# Patient Record
Sex: FEMALE | Race: OTHER | Employment: UNEMPLOYED | URBAN - METROPOLITAN AREA
[De-identification: names, ages, dates, MRNs, and addresses within clinical notes are randomized per-mention and may not be internally consistent; named-entity substitution may affect disease eponyms.]

---

## 2017-06-05 ENCOUNTER — OFFICE VISIT (OUTPATIENT)
Dept: OBGYN CLINIC | Facility: CLINIC | Age: 27
End: 2017-06-05

## 2017-06-05 VITALS
SYSTOLIC BLOOD PRESSURE: 115 MMHG | DIASTOLIC BLOOD PRESSURE: 71 MMHG | HEART RATE: 75 BPM | BODY MASS INDEX: 26.42 KG/M2 | HEIGHT: 63.5 IN | WEIGHT: 151 LBS

## 2017-06-05 DIAGNOSIS — N92.6 MISSED MENSES: Primary | ICD-10-CM

## 2017-06-05 PROCEDURE — 81025 URINE PREGNANCY TEST: CPT | Performed by: ADVANCED PRACTICE MIDWIFE

## 2017-06-05 PROCEDURE — 99202 OFFICE O/P NEW SF 15 MIN: CPT | Performed by: ADVANCED PRACTICE MIDWIFE

## 2017-06-05 RX ORDER — CHOLECALCIFEROL (VITAMIN D3) 25 MCG
1 TABLET,CHEWABLE ORAL DAILY
COMMUNITY

## 2017-06-06 PROBLEM — O09.892 SHORT INTERVAL BETWEEN PREGNANCIES AFFECTING PREGNANCY IN SECOND TRIMESTER, ANTEPARTUM: Status: ACTIVE | Noted: 2017-06-06

## 2017-06-06 NOTE — PROGRESS NOTES
Dennis Finley is a 28year old , current EGA of 10w5d presents for amenorrhea. Reports LMP as 3/23/17 with regular 30 day cycles. Regular cycles since 3 months PP. This is a planned pregnancy and patient is excited.   HAS AN 6MONTH OLD, is contin

## 2017-06-14 ENCOUNTER — NURSE ONLY (OUTPATIENT)
Dept: OBGYN CLINIC | Facility: CLINIC | Age: 27
End: 2017-06-14

## 2017-06-14 VITALS — HEIGHT: 63 IN | WEIGHT: 151.63 LBS | BODY MASS INDEX: 26.87 KG/M2

## 2017-06-14 DIAGNOSIS — Z34.81 ENCOUNTER FOR SUPERVISION OF OTHER NORMAL PREGNANCY IN FIRST TRIMESTER: Primary | ICD-10-CM

## 2017-06-14 DIAGNOSIS — Z83.3 FAMILY HISTORY OF DIABETES IN PREGNANCY: ICD-10-CM

## 2017-06-14 PROBLEM — O09.899 SHORT INTERVAL BETWEEN PREGNANCIES AFFECTING PREGNANCY, ANTEPARTUM: Status: ACTIVE | Noted: 2017-06-14

## 2017-06-14 PROBLEM — Z34.90 SUPERVISION OF NORMAL PREGNANCY: Status: ACTIVE | Noted: 2017-06-14

## 2017-06-14 NOTE — PROGRESS NOTES
Nurse education complete & information given to pt. Strong family hx of multiple members with type II dm. Never had chickenpox. Varicella, 1hr gtt, HA1C ordered w/ NOB labs. Declines FTS.  Pt currently breastfdg 10 month old son but was told by family member

## 2017-06-16 ENCOUNTER — TELEPHONE (OUTPATIENT)
Dept: OBGYN CLINIC | Facility: CLINIC | Age: 27
End: 2017-06-16

## 2017-06-16 NOTE — TELEPHONE ENCOUNTER
Karina Hernandez from Infection Control called to inform midwives that pt does not qualify for zika testing. Karina Hernandez states that pt went to [facility], [facility]. She states that [employer] states there are no known zika virus in that area according to [employer] map.  The pt does not

## 2017-06-21 ENCOUNTER — INITIAL PRENATAL (OUTPATIENT)
Dept: OBGYN CLINIC | Facility: CLINIC | Age: 27
End: 2017-06-21

## 2017-06-21 ENCOUNTER — LAB ENCOUNTER (OUTPATIENT)
Dept: LAB | Facility: HOSPITAL | Age: 27
End: 2017-06-21
Attending: ADVANCED PRACTICE MIDWIFE
Payer: COMMERCIAL

## 2017-06-21 VITALS
WEIGHT: 154 LBS | HEART RATE: 69 BPM | BODY MASS INDEX: 27 KG/M2 | DIASTOLIC BLOOD PRESSURE: 69 MMHG | SYSTOLIC BLOOD PRESSURE: 107 MMHG

## 2017-06-21 DIAGNOSIS — Z83.3 FAMILY HISTORY OF DIABETES IN PREGNANCY: ICD-10-CM

## 2017-06-21 DIAGNOSIS — Z34.81 ENCOUNTER FOR SUPERVISION OF OTHER NORMAL PREGNANCY IN FIRST TRIMESTER: Primary | ICD-10-CM

## 2017-06-21 DIAGNOSIS — Z34.81 ENCOUNTER FOR SUPERVISION OF OTHER NORMAL PREGNANCY IN FIRST TRIMESTER: ICD-10-CM

## 2017-06-21 PROCEDURE — 87340 HEPATITIS B SURFACE AG IA: CPT

## 2017-06-21 PROCEDURE — 86900 BLOOD TYPING SEROLOGIC ABO: CPT

## 2017-06-21 PROCEDURE — 87389 HIV-1 AG W/HIV-1&-2 AB AG IA: CPT

## 2017-06-21 PROCEDURE — 86901 BLOOD TYPING SEROLOGIC RH(D): CPT

## 2017-06-21 PROCEDURE — 86803 HEPATITIS C AB TEST: CPT

## 2017-06-21 PROCEDURE — 86905 BLOOD TYPING RBC ANTIGENS: CPT | Performed by: ADVANCED PRACTICE MIDWIFE

## 2017-06-21 PROCEDURE — 87086 URINE CULTURE/COLONY COUNT: CPT

## 2017-06-21 PROCEDURE — 86850 RBC ANTIBODY SCREEN: CPT

## 2017-06-21 PROCEDURE — 86870 RBC ANTIBODY IDENTIFICATION: CPT

## 2017-06-21 PROCEDURE — 82950 GLUCOSE TEST: CPT

## 2017-06-21 PROCEDURE — 86787 VARICELLA-ZOSTER ANTIBODY: CPT

## 2017-06-21 PROCEDURE — 86762 RUBELLA ANTIBODY: CPT

## 2017-06-21 PROCEDURE — 82306 VITAMIN D 25 HYDROXY: CPT

## 2017-06-21 PROCEDURE — 83036 HEMOGLOBIN GLYCOSYLATED A1C: CPT

## 2017-06-21 PROCEDURE — 86880 COOMBS TEST DIRECT: CPT

## 2017-06-21 PROCEDURE — 86780 TREPONEMA PALLIDUM: CPT

## 2017-06-21 PROCEDURE — 85025 COMPLETE CBC W/AUTO DIFF WBC: CPT

## 2017-06-21 PROCEDURE — 36415 COLL VENOUS BLD VENIPUNCTURE: CPT

## 2017-06-26 ENCOUNTER — TELEPHONE (OUTPATIENT)
Dept: OBGYN CLINIC | Facility: CLINIC | Age: 27
End: 2017-06-26

## 2017-06-26 ENCOUNTER — TELEPHONE (OUTPATIENT)
Dept: PERINATAL CARE | Facility: HOSPITAL | Age: 27
End: 2017-06-26

## 2017-06-26 DIAGNOSIS — Z34.82 ENCOUNTER FOR SUPERVISION OF OTHER NORMAL PREGNANCY IN SECOND TRIMESTER: Primary | ICD-10-CM

## 2017-06-26 DIAGNOSIS — Z92.89: ICD-10-CM

## 2017-06-26 DIAGNOSIS — O09.892 SHORT INTERVAL BETWEEN PREGNANCIES COMPLICATING PREGNANCY IN SECOND TRIMESTER, ANTEPARTUM: ICD-10-CM

## 2017-06-26 NOTE — TELEPHONE ENCOUNTER
Phone call from MES w/ order for MFM consult & for vit d deficiency & pt to start vit d 2000IU daily. Notified pt of consult & MES recs.  Pt verbalized an understanding & agrees w/ plan

## 2017-06-27 ENCOUNTER — TELEPHONE (OUTPATIENT)
Dept: PERINATAL CARE | Facility: HOSPITAL | Age: 27
End: 2017-06-27

## 2017-06-27 ENCOUNTER — TELEPHONE (OUTPATIENT)
Dept: OBGYN CLINIC | Facility: CLINIC | Age: 27
End: 2017-06-27

## 2017-06-27 NOTE — TELEPHONE ENCOUNTER
Consult appt canceled  Review of DX  By Dr Paola Neil communicated with Mary Portillo about appt necessity  Appt cancelled  Explained to pt and

## 2017-06-27 NOTE — TELEPHONE ENCOUNTER
All questions answered. Also spoke w/ MFM.   verbalized an understanding of all info given & agrees w/ plan

## 2017-06-29 PROBLEM — O28.9 POSITIVE ANTENATAL SCREENING TEST: Status: ACTIVE | Noted: 2017-06-29

## 2017-07-01 NOTE — TELEPHONE ENCOUNTER
Receive TC from  regarding patient's antibody/antigen results. Dr. Felisa Jones reports that patient's MFM consult has been cancelled because the +antigen test is irrelevant, since all people have many different types of antigens in the blood.   Antigen

## 2017-07-18 ENCOUNTER — LAB ENCOUNTER (OUTPATIENT)
Dept: LAB | Facility: HOSPITAL | Age: 27
End: 2017-07-18
Attending: ADVANCED PRACTICE MIDWIFE
Payer: COMMERCIAL

## 2017-07-18 ENCOUNTER — ROUTINE PRENATAL (OUTPATIENT)
Dept: OBGYN CLINIC | Facility: CLINIC | Age: 27
End: 2017-07-18

## 2017-07-18 VITALS
WEIGHT: 154.63 LBS | HEART RATE: 82 BPM | SYSTOLIC BLOOD PRESSURE: 102 MMHG | BODY MASS INDEX: 27 KG/M2 | DIASTOLIC BLOOD PRESSURE: 65 MMHG

## 2017-07-18 DIAGNOSIS — Z34.82 ENCOUNTER FOR SUPERVISION OF OTHER NORMAL PREGNANCY IN SECOND TRIMESTER: ICD-10-CM

## 2017-07-18 DIAGNOSIS — R76.0 ABNORMAL ANTIBODY TITER: ICD-10-CM

## 2017-07-18 DIAGNOSIS — Z34.82 ENCOUNTER FOR SUPERVISION OF OTHER NORMAL PREGNANCY IN SECOND TRIMESTER: Primary | ICD-10-CM

## 2017-07-18 LAB
ANTIBODY SCREEN: POSITIVE
APPEARANCE: CLEAR
MULTISTIX LOT#: NORMAL NUMERIC
PH, URINE: 6.5 (ref 4.5–8)
SPECIFIC GRAVITY: 1.01 (ref 1–1.03)
URINE-COLOR: YELLOW
UROBILINOGEN,SEMI-QN: 0.2 MG/DL (ref 0–1.9)

## 2017-07-18 PROCEDURE — 86880 COOMBS TEST DIRECT: CPT

## 2017-07-18 PROCEDURE — 86870 RBC ANTIBODY IDENTIFICATION: CPT

## 2017-07-18 PROCEDURE — 36415 COLL VENOUS BLD VENIPUNCTURE: CPT

## 2017-07-18 PROCEDURE — 86850 RBC ANTIBODY SCREEN: CPT

## 2017-07-18 RX ORDER — ACETAMINOPHEN 160 MG
2000 TABLET,DISINTEGRATING ORAL DAILY
COMMUNITY

## 2017-07-18 NOTE — PROGRESS NOTES
C/o dizziness on occasion. Discussed hydration. Pt declines Quad screen. Will have repeat antibody screen today. RX for Level I ultrasound.

## 2017-07-19 LAB — DIRECT COOMBS POLY: NEGATIVE

## 2017-07-22 ENCOUNTER — TELEPHONE (OUTPATIENT)
Dept: OBGYN CLINIC | Facility: CLINIC | Age: 27
End: 2017-07-22

## 2017-07-22 NOTE — TELEPHONE ENCOUNTER
Spoke w/ [facility] & they stated HLA is clinically insignificant & it is the standard to do antigen testing. If a type & crossmatch is required they must do 1 extra step which takes about 30 minutes extra to complete.

## 2017-07-22 NOTE — TELEPHONE ENCOUNTER
----- Message from CASTRO Herrera sent at 7/21/2017  4:21 PM CDT -----  This is one of the patient's who ended up with antigen testing with her last AB screen. Sounds like this is more of a concern for transfusion than HDN.   Nursing, please trisha

## 2017-08-17 ENCOUNTER — ROUTINE PRENATAL (OUTPATIENT)
Dept: OBGYN CLINIC | Facility: CLINIC | Age: 27
End: 2017-08-17

## 2017-08-17 ENCOUNTER — HOSPITAL ENCOUNTER (OUTPATIENT)
Dept: ULTRASOUND IMAGING | Facility: HOSPITAL | Age: 27
Discharge: HOME | End: 2017-08-17
Attending: ADVANCED PRACTICE MIDWIFE
Payer: COMMERCIAL

## 2017-08-17 VITALS
WEIGHT: 156.38 LBS | HEART RATE: 77 BPM | BODY MASS INDEX: 28 KG/M2 | DIASTOLIC BLOOD PRESSURE: 65 MMHG | SYSTOLIC BLOOD PRESSURE: 106 MMHG

## 2017-08-17 DIAGNOSIS — Z34.82 ENCOUNTER FOR SUPERVISION OF OTHER NORMAL PREGNANCY IN SECOND TRIMESTER: ICD-10-CM

## 2017-08-17 DIAGNOSIS — Z34.82 ENCOUNTER FOR SUPERVISION OF OTHER NORMAL PREGNANCY IN SECOND TRIMESTER: Primary | ICD-10-CM

## 2017-08-17 LAB
APPEARANCE: CLEAR
MULTISTIX LOT#: NORMAL NUMERIC
PH, URINE: 7 (ref 4.5–8)
SPECIFIC GRAVITY: 1.02 (ref 1–1.03)
URINE-COLOR: YELLOW
UROBILINOGEN,SEMI-QN: 0 MG/DL (ref 0–1.9)

## 2017-08-17 PROCEDURE — 76805 OB US >/= 14 WKS SNGL FETUS: CPT | Performed by: ADVANCED PRACTICE MIDWIFE

## 2017-08-17 NOTE — PROGRESS NOTES
Rev MFM ultrasound from today. +FM. Discussed HLA antibodies and clinical significance only if needs transfusion. Discussed 1 hr GCT and CBC in 5 weeks.

## 2017-09-21 ENCOUNTER — APPOINTMENT (OUTPATIENT)
Dept: LAB | Facility: HOSPITAL | Age: 27
End: 2017-09-21
Attending: ADVANCED PRACTICE MIDWIFE
Payer: COMMERCIAL

## 2017-09-21 ENCOUNTER — ROUTINE PRENATAL (OUTPATIENT)
Dept: OBGYN CLINIC | Facility: CLINIC | Age: 27
End: 2017-09-21

## 2017-09-21 VITALS
SYSTOLIC BLOOD PRESSURE: 100 MMHG | DIASTOLIC BLOOD PRESSURE: 66 MMHG | BODY MASS INDEX: 29 KG/M2 | WEIGHT: 163 LBS | HEART RATE: 77 BPM

## 2017-09-21 DIAGNOSIS — Z34.82 ENCOUNTER FOR SUPERVISION OF OTHER NORMAL PREGNANCY IN SECOND TRIMESTER: Primary | ICD-10-CM

## 2017-09-21 DIAGNOSIS — Z34.82 ENCOUNTER FOR SUPERVISION OF OTHER NORMAL PREGNANCY IN SECOND TRIMESTER: ICD-10-CM

## 2017-09-21 LAB
APPEARANCE: CLEAR
ERYTHROCYTE [DISTWIDTH] IN BLOOD BY AUTOMATED COUNT: 15.5 % (ref 11–15)
GLUCOSE 1H P 50 G GLC PO SERPL-MCNC: 108 MG/DL
HCT VFR BLD AUTO: 35.4 % (ref 35–48)
HGB BLD-MCNC: 11.5 G/DL (ref 12–16)
MCH RBC QN AUTO: 27.8 PG (ref 27–32)
MCHC RBC AUTO-ENTMCNC: 32.4 G/DL (ref 32–37)
MCV RBC AUTO: 85.5 FL (ref 80–100)
MULTISTIX LOT#: NORMAL NUMERIC
PH, URINE: 5 (ref 4.5–8)
PLATELET # BLD AUTO: 273 K/UL (ref 140–400)
PMV BLD AUTO: 9.4 FL (ref 7.4–10.3)
RBC # BLD AUTO: 4.13 M/UL (ref 3.7–5.4)
SPECIFIC GRAVITY: 1.02 (ref 1–1.03)
URINE-COLOR: YELLOW
UROBILINOGEN,SEMI-QN: 0 MG/DL (ref 0–1.9)
WBC # BLD AUTO: 11.1 K/UL (ref 4–11)

## 2017-09-21 PROCEDURE — 85027 COMPLETE CBC AUTOMATED: CPT

## 2017-09-21 PROCEDURE — 82950 GLUCOSE TEST: CPT

## 2017-09-21 PROCEDURE — 36415 COLL VENOUS BLD VENIPUNCTURE: CPT

## 2017-10-18 ENCOUNTER — ROUTINE PRENATAL (OUTPATIENT)
Dept: OBGYN CLINIC | Facility: CLINIC | Age: 27
End: 2017-10-18

## 2017-10-18 VITALS
SYSTOLIC BLOOD PRESSURE: 105 MMHG | WEIGHT: 169 LBS | HEART RATE: 85 BPM | BODY MASS INDEX: 30 KG/M2 | DIASTOLIC BLOOD PRESSURE: 73 MMHG

## 2017-10-18 DIAGNOSIS — Z34.83 ENCOUNTER FOR SUPERVISION OF OTHER NORMAL PREGNANCY IN THIRD TRIMESTER: Primary | ICD-10-CM

## 2017-10-18 PROCEDURE — 90715 TDAP VACCINE 7 YRS/> IM: CPT | Performed by: ADVANCED PRACTICE MIDWIFE

## 2017-10-18 PROCEDURE — 90686 IIV4 VACC NO PRSV 0.5 ML IM: CPT | Performed by: ADVANCED PRACTICE MIDWIFE

## 2017-10-18 PROCEDURE — 90472 IMMUNIZATION ADMIN EACH ADD: CPT | Performed by: ADVANCED PRACTICE MIDWIFE

## 2017-10-18 PROCEDURE — 90471 IMMUNIZATION ADMIN: CPT | Performed by: ADVANCED PRACTICE MIDWIFE

## 2017-10-18 NOTE — PROGRESS NOTES
Active fetus  No signs signs of PTL. Reviewed S&S of PTL  Warning signs reviewed  All questions answered.

## 2017-10-22 ENCOUNTER — HOSPITAL ENCOUNTER (OUTPATIENT)
Facility: HOSPITAL | Age: 27
Discharge: HOME | End: 2017-10-22
Attending: ADVANCED PRACTICE MIDWIFE | Admitting: OBSTETRICS & GYNECOLOGY
Payer: COMMERCIAL

## 2017-10-22 ENCOUNTER — APPOINTMENT (OUTPATIENT)
Dept: ULTRASOUND IMAGING | Facility: HOSPITAL | Age: 27
End: 2017-10-22
Attending: ADVANCED PRACTICE MIDWIFE
Payer: COMMERCIAL

## 2017-10-22 VITALS
RESPIRATION RATE: 18 BRPM | DIASTOLIC BLOOD PRESSURE: 65 MMHG | OXYGEN SATURATION: 94 % | HEART RATE: 102 BPM | SYSTOLIC BLOOD PRESSURE: 119 MMHG

## 2017-10-22 LAB
AMNI: NEGATIVE
BASOPHILS # BLD: 0 K/UL (ref 0–0.2)
BASOPHILS NFR BLD: 0 %
BILIRUB UR QL: NEGATIVE
COLOR UR: YELLOW
EOSINOPHIL # BLD: 0.3 K/UL (ref 0–0.7)
EOSINOPHIL NFR BLD: 3 %
ERYTHROCYTE [DISTWIDTH] IN BLOOD BY AUTOMATED COUNT: 15.6 % (ref 11–15)
FIBRINOGEN PPP-MCNC: 586 MG/DL (ref 176–491)
FIBRONECTIN FETAL SPEC QL: NEGATIVE
GLUCOSE UR-MCNC: NEGATIVE MG/DL
HCT VFR BLD AUTO: 39 % (ref 35–48)
HGB BLD-MCNC: 12.7 G/DL (ref 12–16)
HGB UR QL STRIP.AUTO: NEGATIVE
KETONES UR-MCNC: 20 MG/DL
LEUKOCYTE ESTERASE UR QL STRIP.AUTO: NEGATIVE
LYMPHOCYTES # BLD: 2.8 K/UL (ref 1–4)
LYMPHOCYTES NFR BLD: 22 %
MCH RBC QN AUTO: 28 PG (ref 27–32)
MCHC RBC AUTO-ENTMCNC: 32.5 G/DL (ref 32–37)
MCV RBC AUTO: 86.3 FL (ref 80–100)
MONOCYTES # BLD: 0.7 K/UL (ref 0–1)
MONOCYTES NFR BLD: 5 %
NEUTROPHILS # BLD AUTO: 8.8 K/UL (ref 1.8–7.7)
NEUTROPHILS NFR BLD: 70 %
NITRITE UR QL STRIP.AUTO: NEGATIVE
PH UR: 6 [PH] (ref 5–8)
PLATELET # BLD AUTO: 241 K/UL (ref 140–400)
PMV BLD AUTO: 9.7 FL (ref 7.4–10.3)
PROT UR-MCNC: NEGATIVE MG/DL
RBC # BLD AUTO: 4.53 M/UL (ref 3.7–5.4)
SP GR UR STRIP: 1.01 (ref 1–1.03)
UROBILINOGEN UR STRIP-ACNC: <2
VIT C UR-MCNC: NEGATIVE MG/DL
WBC # BLD AUTO: 12.7 K/UL (ref 4–11)

## 2017-10-22 PROCEDURE — 96374 THER/PROPH/DIAG INJ IV PUSH: CPT

## 2017-10-22 PROCEDURE — 99213 OFFICE O/P EST LOW 20 MIN: CPT | Performed by: ADVANCED PRACTICE MIDWIFE

## 2017-10-22 PROCEDURE — 76815 OB US LIMITED FETUS(S): CPT | Performed by: ADVANCED PRACTICE MIDWIFE

## 2017-10-22 PROCEDURE — 59025 FETAL NON-STRESS TEST: CPT | Performed by: ADVANCED PRACTICE MIDWIFE

## 2017-10-22 RX ORDER — SODIUM CHLORIDE, SODIUM LACTATE, POTASSIUM CHLORIDE, CALCIUM CHLORIDE 600; 310; 30; 20 MG/100ML; MG/100ML; MG/100ML; MG/100ML
INJECTION, SOLUTION INTRAVENOUS
Status: COMPLETED
Start: 2017-10-22 | End: 2017-10-22

## 2017-10-22 RX ORDER — SODIUM CHLORIDE, SODIUM LACTATE, POTASSIUM CHLORIDE, CALCIUM CHLORIDE 600; 310; 30; 20 MG/100ML; MG/100ML; MG/100ML; MG/100ML
INJECTION, SOLUTION INTRAVENOUS
Status: DISCONTINUED
Start: 2017-10-22 | End: 2017-10-22

## 2017-10-22 RX ORDER — TERBUTALINE SULFATE 1 MG/ML
INJECTION, SOLUTION SUBCUTANEOUS
Status: COMPLETED
Start: 2017-10-22 | End: 2017-10-22

## 2017-10-22 RX ORDER — SODIUM CHLORIDE 0.9 % (FLUSH) 0.9 %
2 SYRINGE (ML) INJECTION EVERY 8 HOURS
Status: DISCONTINUED | OUTPATIENT
Start: 2017-10-22 | End: 2017-10-22

## 2017-10-22 RX ORDER — SODIUM CHLORIDE 0.9 % (FLUSH) 0.9 %
2 SYRINGE (ML) INJECTION AS NEEDED
Status: DISCONTINUED | OUTPATIENT
Start: 2017-10-22 | End: 2017-10-22

## 2017-10-22 NOTE — H&P
[address] [facility] Patient Status:  Observation    3/23/1990 MRN [id number]   Location [address]  Attending Ramiro Arango, [address] Day # 0 PCP No primary care provider on file. mouth daily.  Disp:  Rfl:  10/21/2017 at Unknown time       Review of Systems:   As documented in HPI        Physical Exam:   Pulse:  [86] 86  Resp:  [18] 18  BP: (108)/(57) 108/57    Constitutional: alert, appears stated age and cooperative  Respiratory: n

## 2017-10-22 NOTE — PROGRESS NOTES
Pt fell on buttocks at approximately 1400. Slipped on wet floor. Has cramped on lower right side of abdomen 2x since falling. Rating 6/10 with cramp. Denies vaginal bleeding. Denies loss of fluid.  Fetus is moving as much as normal.

## 2017-10-23 LAB — HGB F MFR BLD KLEIH BETKE: <0.1 %

## 2017-10-23 NOTE — TRIAGE
[facility]      Triage Note    [facility] Patient Status:  Observation    3/23/1990 MRN [id number]   Location [address] Attending Aleksey Conway, [address] Day # 0 PCP No primary care provider on file. Additional Comments       Reason for visit: pt to triage with c/o falling today on her buttocks today and having cramping after. Pt had FFN done and cervical exam.  Pt found to be closed and FFN was neg.   Pt had ultrasound and it was found to be no

## 2017-10-25 ENCOUNTER — ROUTINE PRENATAL (OUTPATIENT)
Dept: OBGYN CLINIC | Facility: CLINIC | Age: 27
End: 2017-10-25

## 2017-10-25 VITALS
SYSTOLIC BLOOD PRESSURE: 127 MMHG | BODY MASS INDEX: 30.19 KG/M2 | DIASTOLIC BLOOD PRESSURE: 72 MMHG | HEART RATE: 82 BPM | HEIGHT: 63 IN | WEIGHT: 170.38 LBS

## 2017-10-25 DIAGNOSIS — Z34.83 PRENATAL CARE, SUBSEQUENT PREGNANCY IN THIRD TRIMESTER: Primary | ICD-10-CM

## 2017-10-25 NOTE — PROGRESS NOTES
Active fetus  No signs signs of PTL. Denies any vaginal bleeding or leakage of fluid. May resume regular PN schedule and activity. Reviewed S&S of PTL  Warning signs reviewed  All questions answered.

## 2017-11-03 ENCOUNTER — TELEPHONE (OUTPATIENT)
Dept: OBGYN CLINIC | Facility: CLINIC | Age: 27
End: 2017-11-03

## 2017-11-09 ENCOUNTER — ROUTINE PRENATAL (OUTPATIENT)
Dept: OBGYN CLINIC | Facility: CLINIC | Age: 27
End: 2017-11-09

## 2017-11-09 VITALS
DIASTOLIC BLOOD PRESSURE: 72 MMHG | BODY MASS INDEX: 30 KG/M2 | SYSTOLIC BLOOD PRESSURE: 112 MMHG | WEIGHT: 172 LBS | HEART RATE: 88 BPM

## 2017-11-09 DIAGNOSIS — Z34.83 ENCOUNTER FOR SUPERVISION OF OTHER NORMAL PREGNANCY IN THIRD TRIMESTER: Primary | ICD-10-CM

## 2017-11-09 NOTE — PROGRESS NOTES
Feeling well, no complaints. Pt unsure whether she will want epidural this birth or not. Does not desire birth control information. Rev warnings/SOL/when to call.   Discussed constipation relief

## 2017-11-24 ENCOUNTER — ROUTINE PRENATAL (OUTPATIENT)
Dept: OBGYN CLINIC | Facility: CLINIC | Age: 27
End: 2017-11-24

## 2017-11-24 VITALS
BODY MASS INDEX: 31 KG/M2 | WEIGHT: 175 LBS | HEART RATE: 89 BPM | SYSTOLIC BLOOD PRESSURE: 112 MMHG | DIASTOLIC BLOOD PRESSURE: 71 MMHG

## 2017-11-24 DIAGNOSIS — Z34.83 ENCOUNTER FOR SUPERVISION OF OTHER NORMAL PREGNANCY IN THIRD TRIMESTER: ICD-10-CM

## 2017-11-24 DIAGNOSIS — Z3A.35 35 WEEKS GESTATION OF PREGNANCY: Primary | ICD-10-CM

## 2017-11-24 NOTE — PROGRESS NOTES
Baby active. No SOL. Kick counts reviewed. Questions about birth control PP. Interested in IUD. Given info on [address].  Gbs for nv

## 2017-11-30 ENCOUNTER — ROUTINE PRENATAL (OUTPATIENT)
Dept: OBGYN CLINIC | Facility: CLINIC | Age: 27
End: 2017-11-30

## 2017-11-30 VITALS
WEIGHT: 176 LBS | HEART RATE: 85 BPM | DIASTOLIC BLOOD PRESSURE: 72 MMHG | BODY MASS INDEX: 31 KG/M2 | SYSTOLIC BLOOD PRESSURE: 108 MMHG

## 2017-11-30 DIAGNOSIS — Z34.83 ENCOUNTER FOR SUPERVISION OF OTHER NORMAL PREGNANCY IN THIRD TRIMESTER: Primary | ICD-10-CM

## 2017-11-30 NOTE — PROGRESS NOTES
+FM. Denies vaginal bleeding, LOF, contractions. Pt reports periodic dizziness since last appt. She has taken her blood pressure when this occurs and readings have been 105/55, 107/60, 117/73. Denies SOB, HA, vision changes.  Reports she does not feel she h

## 2017-12-11 ENCOUNTER — ROUTINE PRENATAL (OUTPATIENT)
Dept: OBGYN CLINIC | Facility: CLINIC | Age: 27
End: 2017-12-11

## 2017-12-11 VITALS
DIASTOLIC BLOOD PRESSURE: 79 MMHG | BODY MASS INDEX: 31.63 KG/M2 | HEART RATE: 84 BPM | WEIGHT: 178.5 LBS | SYSTOLIC BLOOD PRESSURE: 108 MMHG | HEIGHT: 63 IN

## 2017-12-11 DIAGNOSIS — Z34.83 PRENATAL CARE, SUBSEQUENT PREGNANCY IN THIRD TRIMESTER: Primary | ICD-10-CM

## 2017-12-12 NOTE — PROGRESS NOTES
Active baby. Normal discomforts of 3rd trimester. Discussed comfort measures. Reviewed danger signs. GBS is neg.

## 2017-12-20 ENCOUNTER — ROUTINE PRENATAL (OUTPATIENT)
Dept: OBGYN CLINIC | Facility: CLINIC | Age: 27
End: 2017-12-20

## 2017-12-20 ENCOUNTER — APPOINTMENT (OUTPATIENT)
Dept: LAB | Facility: HOSPITAL | Age: 27
End: 2017-12-20
Attending: ADVANCED PRACTICE MIDWIFE
Payer: COMMERCIAL

## 2017-12-20 VITALS
BODY MASS INDEX: 32 KG/M2 | HEART RATE: 80 BPM | WEIGHT: 180 LBS | DIASTOLIC BLOOD PRESSURE: 70 MMHG | SYSTOLIC BLOOD PRESSURE: 106 MMHG

## 2017-12-20 DIAGNOSIS — Z34.83 ENCOUNTER FOR SUPERVISION OF OTHER NORMAL PREGNANCY IN THIRD TRIMESTER: ICD-10-CM

## 2017-12-20 DIAGNOSIS — Z34.83 ENCOUNTER FOR SUPERVISION OF OTHER NORMAL PREGNANCY IN THIRD TRIMESTER: Primary | ICD-10-CM

## 2017-12-20 PROCEDURE — 87389 HIV-1 AG W/HIV-1&-2 AB AG IA: CPT

## 2017-12-20 PROCEDURE — 86780 TREPONEMA PALLIDUM: CPT

## 2017-12-20 PROCEDURE — 36415 COLL VENOUS BLD VENIPUNCTURE: CPT

## 2017-12-20 NOTE — PROGRESS NOTES
Active baby. No signs of labor. Tired. Discussed comfort measures. Declines SVE today, recommend with next visit.

## 2017-12-29 ENCOUNTER — ANESTHESIA EVENT (OUTPATIENT)
Dept: OBGYN UNIT | Facility: HOSPITAL | Age: 27
End: 2017-12-29
Payer: COMMERCIAL

## 2017-12-29 ENCOUNTER — ANESTHESIA (OUTPATIENT)
Dept: OBGYN UNIT | Facility: HOSPITAL | Age: 27
End: 2017-12-29
Payer: COMMERCIAL

## 2017-12-29 ENCOUNTER — HOSPITAL ENCOUNTER (INPATIENT)
Facility: HOSPITAL | Age: 27
LOS: 2 days | Discharge: HOME | End: 2017-12-31
Attending: ADVANCED PRACTICE MIDWIFE | Admitting: OBSTETRICS & GYNECOLOGY
Payer: COMMERCIAL

## 2017-12-29 ENCOUNTER — ROUTINE PRENATAL (OUTPATIENT)
Dept: OBGYN CLINIC | Facility: CLINIC | Age: 27
End: 2017-12-29

## 2017-12-29 VITALS
WEIGHT: 183 LBS | SYSTOLIC BLOOD PRESSURE: 126 MMHG | DIASTOLIC BLOOD PRESSURE: 78 MMHG | HEART RATE: 92 BPM | BODY MASS INDEX: 32.43 KG/M2 | HEIGHT: 63 IN

## 2017-12-29 DIAGNOSIS — Z34.83 PRENATAL CARE, SUBSEQUENT PREGNANCY IN THIRD TRIMESTER: Primary | ICD-10-CM

## 2017-12-29 PROBLEM — Z34.93 SUPERVISION OF NORMAL PREGNANCY IN THIRD TRIMESTER: Status: ACTIVE | Noted: 2017-12-29

## 2017-12-29 LAB
ANTIBODY SCREEN: NEGATIVE
ERYTHROCYTE [DISTWIDTH] IN BLOOD BY AUTOMATED COUNT: 15.9 % (ref 11–15)
HCT VFR BLD AUTO: 39.2 % (ref 35–48)
HGB BLD-MCNC: 13 G/DL (ref 12–16)
MCH RBC QN AUTO: 28.3 PG (ref 27–32)
MCHC RBC AUTO-ENTMCNC: 33.3 G/DL (ref 32–37)
MCV RBC AUTO: 84.9 FL (ref 80–100)
PLATELET # BLD AUTO: 213 K/UL (ref 140–400)
PMV BLD AUTO: 9.5 FL (ref 7.4–10.3)
RBC # BLD AUTO: 4.61 M/UL (ref 3.7–5.4)
RH BLOOD TYPE: POSITIVE
WBC # BLD AUTO: 9 K/UL (ref 4–11)

## 2017-12-29 PROCEDURE — 3E033VJ INTRODUCTION OF OTHER HORMONE INTO PERIPHERAL VEIN, PERCUTANEOUS APPROACH: ICD-10-PCS | Performed by: ADVANCED PRACTICE MIDWIFE

## 2017-12-29 RX ORDER — NALBUPHINE HCL 10 MG/ML
2.5 AMPUL (ML) INJECTION
Status: DISCONTINUED | OUTPATIENT
Start: 2017-12-29 | End: 2017-12-31

## 2017-12-29 RX ORDER — AMMONIA INHALANTS 0.04 G/.3ML
0.3 INHALANT RESPIRATORY (INHALATION) AS NEEDED
Status: DISCONTINUED | OUTPATIENT
Start: 2017-12-29 | End: 2017-12-30 | Stop reason: HOSPADM

## 2017-12-29 RX ORDER — LIDOCAINE HYDROCHLORIDE AND EPINEPHRINE 20; 5 MG/ML; UG/ML
INJECTION, SOLUTION EPIDURAL; INFILTRATION; INTRACAUDAL; PERINEURAL
Status: DISCONTINUED
Start: 2017-12-29 | End: 2017-12-30 | Stop reason: WASHOUT

## 2017-12-29 RX ORDER — DEXTROSE, SODIUM CHLORIDE, SODIUM LACTATE, POTASSIUM CHLORIDE, AND CALCIUM CHLORIDE 5; .6; .31; .03; .02 G/100ML; G/100ML; G/100ML; G/100ML; G/100ML
125 INJECTION, SOLUTION INTRAVENOUS CONTINUOUS
Status: DISCONTINUED | OUTPATIENT
Start: 2017-12-29 | End: 2017-12-30 | Stop reason: HOSPADM

## 2017-12-29 RX ORDER — PHENYLEPHRINE HCL IN 0.9% NACL 0.5 MG/5ML
SYRINGE (ML) INTRAVENOUS
Status: DISCONTINUED
Start: 2017-12-29 | End: 2017-12-30 | Stop reason: WASHOUT

## 2017-12-29 RX ORDER — SODIUM CHLORIDE, SODIUM LACTATE, POTASSIUM CHLORIDE, CALCIUM CHLORIDE 600; 310; 30; 20 MG/100ML; MG/100ML; MG/100ML; MG/100ML
INJECTION, SOLUTION INTRAVENOUS
Status: DISCONTINUED
Start: 2017-12-29 | End: 2017-12-30 | Stop reason: WASHOUT

## 2017-12-29 RX ORDER — LIDOCAINE HYDROCHLORIDE 10 MG/ML
INJECTION, SOLUTION EPIDURAL; INFILTRATION; INTRACAUDAL; PERINEURAL AS NEEDED
Status: DISCONTINUED | OUTPATIENT
Start: 2017-12-29 | End: 2017-12-30 | Stop reason: SURG

## 2017-12-29 RX ORDER — TERBUTALINE SULFATE 1 MG/ML
0.25 INJECTION, SOLUTION SUBCUTANEOUS AS NEEDED
Status: DISCONTINUED | OUTPATIENT
Start: 2017-12-29 | End: 2017-12-30 | Stop reason: HOSPADM

## 2017-12-29 RX ORDER — LIDOCAINE HYDROCHLORIDE AND EPINEPHRINE 15; 5 MG/ML; UG/ML
INJECTION, SOLUTION EPIDURAL AS NEEDED
Status: DISCONTINUED | OUTPATIENT
Start: 2017-12-29 | End: 2017-12-30 | Stop reason: SURG

## 2017-12-29 RX ORDER — IBUPROFEN 600 MG/1
600 TABLET ORAL ONCE AS NEEDED
Status: DISCONTINUED | OUTPATIENT
Start: 2017-12-29 | End: 2017-12-30 | Stop reason: HOSPADM

## 2017-12-29 RX ORDER — EPHEDRINE SULFATE/0.9% NACL/PF 25 MG/5 ML
5 SYRINGE (ML) INTRAVENOUS AS NEEDED
Status: DISCONTINUED | OUTPATIENT
Start: 2017-12-29 | End: 2017-12-30

## 2017-12-29 RX ORDER — EPHEDRINE SULFATE/0.9% NACL/PF 25 MG/5 ML
SYRINGE (ML) INTRAVENOUS
Status: DISCONTINUED
Start: 2017-12-29 | End: 2017-12-30 | Stop reason: WASHOUT

## 2017-12-29 RX ORDER — TRISODIUM CITRATE DIHYDRATE AND CITRIC ACID MONOHYDRATE 500; 334 MG/5ML; MG/5ML
30 SOLUTION ORAL AS NEEDED
Status: DISCONTINUED | OUTPATIENT
Start: 2017-12-29 | End: 2017-12-30 | Stop reason: HOSPADM

## 2017-12-29 RX ORDER — SODIUM CHLORIDE 0.9 % (FLUSH) 0.9 %
10 SYRINGE (ML) INJECTION AS NEEDED
Status: DISCONTINUED | OUTPATIENT
Start: 2017-12-29 | End: 2017-12-30 | Stop reason: HOSPADM

## 2017-12-29 RX ORDER — BUPIVACAINE HYDROCHLORIDE 2.5 MG/ML
INJECTION, SOLUTION EPIDURAL; INFILTRATION; INTRACAUDAL AS NEEDED
Status: DISCONTINUED | OUTPATIENT
Start: 2017-12-29 | End: 2017-12-30 | Stop reason: SURG

## 2017-12-29 RX ORDER — BUPIVACAINE HYDROCHLORIDE 2.5 MG/ML
INJECTION, SOLUTION EPIDURAL; INFILTRATION; INTRACAUDAL
Status: COMPLETED
Start: 2017-12-29 | End: 2017-12-29

## 2017-12-29 RX ORDER — LIDOCAINE HYDROCHLORIDE 10 MG/ML
30 INJECTION, SOLUTION EPIDURAL; INFILTRATION; INTRACAUDAL; PERINEURAL ONCE
Status: DISCONTINUED | OUTPATIENT
Start: 2017-12-29 | End: 2017-12-30 | Stop reason: HOSPADM

## 2017-12-29 RX ORDER — SODIUM CHLORIDE, SODIUM LACTATE, POTASSIUM CHLORIDE, CALCIUM CHLORIDE 600; 310; 30; 20 MG/100ML; MG/100ML; MG/100ML; MG/100ML
INJECTION, SOLUTION INTRAVENOUS
Status: COMPLETED
Start: 2017-12-29 | End: 2017-12-29

## 2017-12-29 RX ADMIN — LIDOCAINE HYDROCHLORIDE 3 ML: 10 INJECTION, SOLUTION EPIDURAL; INFILTRATION; INTRACAUDAL; PERINEURAL at 22:20:00

## 2017-12-29 RX ADMIN — BUPIVACAINE HYDROCHLORIDE 5 ML: 2.5 INJECTION, SOLUTION EPIDURAL; INFILTRATION; INTRACAUDAL at 22:35:00

## 2017-12-29 RX ADMIN — LIDOCAINE HYDROCHLORIDE AND EPINEPHRINE 3 ML: 15; 5 INJECTION, SOLUTION EPIDURAL at 22:31:00

## 2017-12-29 NOTE — PROGRESS NOTES
Desires IOL. Uncomfortable contractions the last 2 nights. Cervix FT/50/-2 will go to [facility] for IOL.  EFW 8.5lbs

## 2017-12-30 LAB
BASOPHILS # BLD: 0 K/UL (ref 0–0.2)
BASOPHILS NFR BLD: 0 %
EOSINOPHIL # BLD: 0.1 K/UL (ref 0–0.7)
EOSINOPHIL NFR BLD: 1 %
ERYTHROCYTE [DISTWIDTH] IN BLOOD BY AUTOMATED COUNT: 15.7 % (ref 11–15)
HCT VFR BLD AUTO: 33.5 % (ref 35–48)
HGB BLD-MCNC: 11.1 G/DL (ref 12–16)
LYMPHOCYTES # BLD: 2.2 K/UL (ref 1–4)
LYMPHOCYTES NFR BLD: 15 %
MCH RBC QN AUTO: 28 PG (ref 27–32)
MCHC RBC AUTO-ENTMCNC: 33.1 G/DL (ref 32–37)
MCV RBC AUTO: 84.6 FL (ref 80–100)
MONOCYTES # BLD: 0.8 K/UL (ref 0–1)
MONOCYTES NFR BLD: 5 %
NEUTROPHILS # BLD AUTO: 11.7 K/UL (ref 1.8–7.7)
NEUTROPHILS NFR BLD: 79 %
PLATELET # BLD AUTO: 195 K/UL (ref 140–400)
PMV BLD AUTO: 9.8 FL (ref 7.4–10.3)
RBC # BLD AUTO: 3.96 M/UL (ref 3.7–5.4)
WBC # BLD AUTO: 14.8 K/UL (ref 4–11)

## 2017-12-30 PROCEDURE — 59400 OBSTETRICAL CARE: CPT | Performed by: ADVANCED PRACTICE MIDWIFE

## 2017-12-30 PROCEDURE — 0KQM0ZZ REPAIR PERINEUM MUSCLE, OPEN APPROACH: ICD-10-PCS | Performed by: ADVANCED PRACTICE MIDWIFE

## 2017-12-30 RX ORDER — IBUPROFEN 600 MG/1
600 TABLET ORAL EVERY 4 HOURS PRN
Status: DISCONTINUED | OUTPATIENT
Start: 2017-12-30 | End: 2017-12-31

## 2017-12-30 RX ORDER — IBUPROFEN 400 MG/1
200 TABLET ORAL EVERY 4 HOURS PRN
Status: DISCONTINUED | OUTPATIENT
Start: 2017-12-30 | End: 2017-12-31

## 2017-12-30 RX ORDER — DOCUSATE SODIUM 100 MG/1
100 CAPSULE, LIQUID FILLED ORAL 2 TIMES DAILY
Status: DISCONTINUED | OUTPATIENT
Start: 2017-12-30 | End: 2017-12-31

## 2017-12-30 RX ORDER — ONDANSETRON 2 MG/ML
4 INJECTION INTRAMUSCULAR; INTRAVENOUS EVERY 6 HOURS PRN
Status: DISCONTINUED | OUTPATIENT
Start: 2017-12-30 | End: 2017-12-31

## 2017-12-30 RX ORDER — AMMONIA INHALANTS 0.04 G/.3ML
0.3 INHALANT RESPIRATORY (INHALATION) AS NEEDED
Status: DISCONTINUED | OUTPATIENT
Start: 2017-12-30 | End: 2017-12-31

## 2017-12-30 RX ORDER — PRENATAL VIT,CAL 76/IRON/FOLIC 29 MG-1 MG
1 TABLET ORAL DAILY
Status: DISCONTINUED | OUTPATIENT
Start: 2017-12-30 | End: 2017-12-31

## 2017-12-30 RX ORDER — SIMETHICONE 80 MG
80 TABLET,CHEWABLE ORAL 3 TIMES DAILY PRN
Status: DISCONTINUED | OUTPATIENT
Start: 2017-12-30 | End: 2017-12-31

## 2017-12-30 RX ORDER — DIAPER,BRIEF,INFANT-TODD,DISP
1 EACH MISCELLANEOUS EVERY 6 HOURS PRN
Status: DISCONTINUED | OUTPATIENT
Start: 2017-12-30 | End: 2017-12-31

## 2017-12-30 RX ORDER — IBUPROFEN 400 MG/1
400 TABLET ORAL EVERY 4 HOURS PRN
Status: DISCONTINUED | OUTPATIENT
Start: 2017-12-30 | End: 2017-12-31

## 2017-12-30 RX ORDER — BISACODYL 10 MG
10 SUPPOSITORY, RECTAL RECTAL ONCE AS NEEDED
Status: DISCONTINUED | OUTPATIENT
Start: 2017-12-30 | End: 2017-12-31

## 2017-12-30 RX ORDER — SODIUM CHLORIDE 0.9 % (FLUSH) 0.9 %
10 SYRINGE (ML) INJECTION AS NEEDED
Status: DISCONTINUED | OUTPATIENT
Start: 2017-12-30 | End: 2017-12-31

## 2017-12-30 NOTE — ANESTHESIA PREPROCEDURE EVALUATION
Anesthesia PreOp Note    HPI:     Florence Hughes is a 28year old female who presents for preoperative consultation requested by: * No surgeons listed *    Date of Surgery: 12/29/2017    * No procedures listed *  Indication: * No pre-op diagnosis entered * Terbutaline Sulfate (BRETHINE) 1 MG/ML injection 0.25 mg 0.25 mg Subcutaneous PRN Deb Boykin, DICK     ibuprofen (MOTRIN) tab 600 mg 600 mg Oral Once PRN Mandeep , CNM     fentaNYL citrate (SUBLIMAZE) 0.05 MG/ML injection         lidocaine file       Available pre-op labs reviewed.     Lab Results  Component Value Date   WBC 9.0 12/29/2017   RBC 4.61 12/29/2017   HGB 13.0 12/29/2017   HCT 39.2 12/29/2017   MCV 84.9 12/29/2017   MCH 28.3 12/29/2017   MCHC 33.3 12/29/2017   RDW 15.9 (H) 12/29/2 No

## 2017-12-30 NOTE — PROGRESS NOTES
[place] [facility]    Labor Progress Note    Larry Beckett Patient Status:  Inpatient    3/23/1990 MRN [id number]   Location [address] [facility] Attending Jordi Pereira, [address] Day # 0 PCP No primary care provider on fi

## 2017-12-30 NOTE — H&P
[address] Patient Status:  No patient class for patient encounter    3/23/1990 MRN [id number]   Location [address] [place] [address] Attending [facility] Day # 0 Admitt to contractions for the past 2 days    Physical Exam:   [unfilled]    Constitutional: alert, appears stated age and cooperative; Coping well, uncomfortable with contractions.    Respiratory: clear to auscultation bilaterally, clear and unlabored  Cardiac: r Intervention: IV Pitocin induction.     Roxane Cornejo  12/29/2017  1:35 PM

## 2017-12-30 NOTE — L&D DELIVERY NOTE
Beba Gary  [[id number]]     Labor Events    Rupture date:  12/29/17   Rupture time:  2300   Rupture type:  SROM   Fluid color:  Clear          Labor Event Times    Labor onset date/time:   12/29/17 2100   Dilation complete date/time:   12/30/17 0119   S Minute:   10 Minute:   15 Minute:   20 Minute:     Skin color:   Heart rate:   Reflex irritability:   Muscle tone:   Respiratory effort:    Total:            1    2    2    2    2    9             1    2    2    2    2    9

## 2017-12-30 NOTE — ANESTHESIA POSTPROCEDURE EVALUATION
Patient: Rafael Ng    Procedure Summary     Date:  12/29/17 Room / Location:      Anesthesia Start:  2213 Anesthesia Stop:  12/30/17 0151    Procedure:  LABOR ANALGESIA Diagnosis:      Scheduled Providers:   Anesthesiologist:  MD Batool Roper

## 2017-12-30 NOTE — ANESTHESIA PROCEDURE NOTES
Labor Analgesia  Performed by: Jie Linn  Authorized by: Jie Linn     Patient Location:  OB  Start Time:  12/29/2017 10:18 PM  End Time:  12/29/2017 10:34 PM  Reason for Block: labor epidural    Anesthesiologist:  Jie Linn  Performed by:  Kim Tena

## 2017-12-31 VITALS
RESPIRATION RATE: 14 BRPM | SYSTOLIC BLOOD PRESSURE: 114 MMHG | TEMPERATURE: 98 F | HEART RATE: 96 BPM | DIASTOLIC BLOOD PRESSURE: 68 MMHG

## 2017-12-31 RX ORDER — IBUPROFEN 600 MG/1
600 TABLET ORAL EVERY 6 HOURS PRN
Qty: 30 TABLET | Refills: 1 | Status: SHIPPED | OUTPATIENT
Start: 2017-12-31

## 2017-12-31 NOTE — LACTATION NOTE
LACTATION NOTE - MOTHER      Evaluation Type: Inpatient    Problems identified  Problems identified: Knowledge deficit    Maternal history  Other/comment: short interval between pregnancies, LGA    Breastfeeding goal  Breastfeeding goal: To maintain breast

## 2017-12-31 NOTE — PROGRESS NOTES
[place] [facility]    OB/GYNE Progress Note      Katherine Joshi Patient Status:  Inpatient    3/23/1990 MRN [id number]   Location [facility] 3SE Attending Esperanza Cuevas, [address] Day # 2 PCP No primary care provider on file.        As 06/21/2017   ABO O 12/29/2017   RH Positive 12/29/2017   WBC 14.8 (H) 12/30/2017   HGB 11.1 (L) 12/30/2017   HCT 33.5 (L) 12/30/2017    12/30/2017         Lab Results  Component Value Date   COLORUR Yellow 10/22/2017   CLARITY Hazy (A) 10/22/2017

## 2017-12-31 NOTE — DISCHARGE SUMMARY
[facility]    Discharge Summary    Nafisa Hernandez Patient Status:  Inpatient    3/23/1990 MRN [id number]   Location [facility] 3SE Attending Cheryl Krishnamurthy, [address] Day # 2       Delivering OB Clinician: Danyell Levine

## 2017-12-31 NOTE — LACTATION NOTE
This note was copied from a baby's chart.   LACTATION NOTE - INFANT    Evaluation Type  Evaluation Type: Inpatient    Problems & Assessment  Infant Assessment: Oral mucous membranes moist;Skin color: pink or appropriate for ethnicity;Hunger cues present;Goo

## 2017-12-31 NOTE — PLAN OF CARE
Problem: POSTPARTUM  Goal: Optimize infant feeding at the breast  INTERVENTIONS:  - Initiate breast feeding within first hour after birth. - Monitor effectiveness of current breast feeding efforts. - Assess support systems available to mother/family.   - and assistance until it is safe to breastfeed infant. Outcome: Progressing  Baby is not interested in feeding, spitty and gaggy. Mom is hand expressing colostrum directly into baby's mouth. She has lots of colostrum.   Told her to continue about q1h unti

## 2018-01-18 ENCOUNTER — POSTPARTUM (OUTPATIENT)
Dept: OBGYN CLINIC | Facility: CLINIC | Age: 28
End: 2018-01-18

## 2018-01-18 VITALS
HEART RATE: 57 BPM | SYSTOLIC BLOOD PRESSURE: 116 MMHG | DIASTOLIC BLOOD PRESSURE: 81 MMHG | WEIGHT: 155 LBS | BODY MASS INDEX: 27 KG/M2

## 2018-01-18 NOTE — PROGRESS NOTES
HPI:   Fran Mendoza is a 28year old  who presents for a 2 week Postpartum visit. Pt accompanied by  and children in waiting room. Reports adapting well and coping well. Breastfeeding successfully.  Pt reports fever for a few nights in the Grandmother      adult onset   • Diabetes Maternal Grandfather      adult onset      Social History:   Smoking status: Never Smoker                                                              Smokeless tobacco: Never Used                      Alcohol use:

## 2018-02-15 NOTE — PROGRESS NOTES
HPI:   Katherine Joshi is a 28year old  who presents for a 6 week Postpartum visit. Reports feeling overwhelmed and lack of support. Pt's in laws live with them but offer no help in the home.   works long hours and pt does not go out during th Family History   Problem Relation Age of Onset   • Diabetes Father      adult onset   • Diabetes Maternal Grandmother      adult onset   • Diabetes Maternal Grandfather      adult onset      Social History:   Smoking status: Never Smoker and limitations  Discussed resumption of aerobic, pelvic floor, and  core strengthening exercises  Rev contraceptive options and patient elects condoms for now.  Considering paragard IUD    (F53) Postpartum depression  (primary encounter diagnosis)  Comment

## 2018-02-16 PROBLEM — O28.9 POSITIVE ANTENATAL SCREENING TEST: Status: RESOLVED | Noted: 2017-06-29 | Resolved: 2018-02-16

## 2018-02-16 PROBLEM — Z34.90 SUPERVISION OF NORMAL PREGNANCY: Status: RESOLVED | Noted: 2017-06-14 | Resolved: 2018-02-16

## 2018-02-16 PROBLEM — Z34.93 SUPERVISION OF NORMAL PREGNANCY IN THIRD TRIMESTER: Status: RESOLVED | Noted: 2017-12-29 | Resolved: 2018-02-16

## 2018-02-16 PROBLEM — O09.892 SHORT INTERVAL BETWEEN PREGNANCIES AFFECTING PREGNANCY IN SECOND TRIMESTER, ANTEPARTUM: Status: RESOLVED | Noted: 2017-06-06 | Resolved: 2018-02-16

## 2018-02-23 ENCOUNTER — TELEPHONE (OUTPATIENT)
Dept: OBGYN CLINIC | Facility: CLINIC | Age: 28
End: 2018-02-23

## 2018-03-13 ENCOUNTER — TELEPHONE (OUTPATIENT)
Dept: OBGYN CLINIC | Facility: CLINIC | Age: 28
End: 2018-03-13

## 2018-03-14 NOTE — TELEPHONE ENCOUNTER
Please try calling patient. She was pretty depressed when I saw her last, and she didn't schedule follow-up visit like she was supposed to.   Please find out if she is seeing a therapist yet, and if she wants to come back in to talk with me and/or get IU

## 2018-03-23 ENCOUNTER — TELEPHONE (OUTPATIENT)
Dept: OBGYN CLINIC | Facility: CLINIC | Age: 28
End: 2018-03-23

## 2021-07-26 NOTE — PROGRESS NOTES
[place] [facility]    Labor Progress Note    Suzzanne Leventhal Patient Status:  Inpatient    3/23/1990 MRN [id number]   Location [address]  Attending Aureliano Dudley, [address] Day # 0 PCP No primary care provider on fi Left arm;

## (undated) NOTE — LETTER
VACCINE ADMINISTRATION RECORD  PARENT / GUARDIAN APPROVAL  Date: 10/18/2017  Vaccine administered to: Thania Weathers     : 3/23/1990    MRN: [id number]    A copy of the appropriate Centers for Disease Control and Prevention Vaccine Information statement

## (undated) NOTE — MR AVS SNAPSHOT
[facility]  [address]  [phone]               Thank you for choosing us for your health care visit with Nurse.   We are glad to serve you and happy to provide you with this summary of your vis Assoc Dx:  Encounter for supervision of other normal pregnancy in first trimester [Z34.81], Family history of diabetes in pregnancy [Z84.89]           Urine Culture, Routine    Complete by:  Jun 14, 2017 (Approximate)    Assoc Dx:  Encounter for Mary Nunez No Known Allergies                Today's Vital Signs     Height Weight BMI          5' 3\" (1.6 m) 151 lb 9.6 oz (68.765 kg) 26.86 kg/m2           Current Medications          This list is accurate as of: 6/14/17  2:47 PM.  Always use your most recent me

## (undated) NOTE — MR AVS SNAPSHOT
[facility]  [address]  [phone]               Thank you for choosing us for your health care visit with CASTRO Peter.   We are glad to serve you and happy to provide you with this key Control Line Present with a clear background (yes/no) yes Yes/No    Kit Lot # [id number] Numeric    Kit Expiration Date 4/10/18 Date                  [employer]     Sign up for [employer], your secure online medical record.   [employer] will allow you to access patient Start activities slowly and build up over time Do what you like   Get your heart pumping – brisk walking, biking, swimming Even 10 minute increments are effective and add up over the week   2 ½ hours per week – spread out over time Use a dav to keep you

## (undated) NOTE — LETTER
3/16/2018              Dennis Finley        [address] [address]         Dear Miranda Wilkinsno,    We have been unable to reach you by phone. We have important medical information we need to discuss with you.  Please contact o